# Patient Record
Sex: MALE
[De-identification: names, ages, dates, MRNs, and addresses within clinical notes are randomized per-mention and may not be internally consistent; named-entity substitution may affect disease eponyms.]

---

## 2023-03-31 PROBLEM — Z00.00 ENCOUNTER FOR PREVENTIVE HEALTH EXAMINATION: Status: ACTIVE | Noted: 2023-03-31

## 2023-05-01 ENCOUNTER — APPOINTMENT (OUTPATIENT)
Dept: CARDIOLOGY | Facility: CLINIC | Age: 33
End: 2023-05-01
Payer: SELF-PAY

## 2023-05-01 VITALS
OXYGEN SATURATION: 96 % | DIASTOLIC BLOOD PRESSURE: 82 MMHG | SYSTOLIC BLOOD PRESSURE: 134 MMHG | HEART RATE: 106 BPM | TEMPERATURE: 98.6 F | RESPIRATION RATE: 16 BRPM | HEIGHT: 66 IN | BODY MASS INDEX: 27.48 KG/M2 | WEIGHT: 171 LBS

## 2023-05-01 DIAGNOSIS — Z83.438 FAMILY HISTORY OF OTHER DISORDER OF LIPOPROTEIN METABOLISM AND OTHER LIPIDEMIA: ICD-10-CM

## 2023-05-01 PROCEDURE — 99203 OFFICE O/P NEW LOW 30 MIN: CPT

## 2023-05-01 NOTE — REASON FOR VISIT
[CV Risk Factors and Non-Cardiac Disease] : CV risk factors and non-cardiac disease [Hyperlipidemia] : hyperlipidemia [FreeTextEntry3] : Dr. Hidalgo, Dr. Richardson [FreeTextEntry1] : This is a 32 year old male with past medical history of hypercholesterolemia presenting to the office for a lipid consultation. Patient denies any shortness of breath, palpitations, dizziness or episodes of syncope. \par Family history is significant for hypercholesterolemia. \par Patient denies drinking alcohol but does admit to being a former smoker. Patient quit smoking 1 month ago and smoked 10 -15 cigarettes/day for 20 years. He also drinks 2-3 cups of coffee per day \par Cardiac risk factors include hypercholesterolemia\par Patient had a CT cardiac calcium score of 0 on July 2021. \par \par Labs demonstrated Cholesterol 275 , HDL , Triglyceride 182 ,  , Non-  , Lipoprotein A 186\par

## 2023-05-01 NOTE — DISCUSSION/SUMMARY
[FreeTextEntry1] : This is a 32-year-old male with no significant past medical history who comes in for lipid consultation.\par He denies chest pain, shortness of breath, dizziness or syncope.  He has no history of rheumatic fever.  He does not drink excessive caffeine or alcohol.\par Cardiac risk factors include hypercholesterolemia, history of smoking approximately a pack to a pack and 1/2/week for 20 years (he stopped March 2023).  Both parents have hypercholesterolemia.\par Lipid profile done March 14, 2023 demonstrated elevated lipoprotein a of 186 nmol/L, elevated C-reactive protein of 9.9 mg/dL, cholesterol of 275, HDL of 45, LDL calculated under 94 mg/dL, and non-HDL cholesterol 230 mg/dL.\par The patient had a coronary artery calcium score of 0 in July 2021 (he will get me a copy of that report).\par The patient's lipid profile is consistent with heterozygous familial hypercholesterolemia.  As noted above both parents have hypercholesterolemia.\par The patient's LDL target is less than 100 mg/dL.\par The patient's elevated lipoprotein a is an independent risk marker for early atherosclerosis, in the form of coronary disease, stroke, as well as aortic stenosis.  While there is no current therapy available for isolated elevated lipoprotein a, our target treatment goal remains LDL cholesterol for now.\par He will start Crestor 20 mg daily.  He will follow-up blood work in 6 to 8 weeks.  Further recommendation will made at that time.  If the patient is not at target LDL cholesterol he will require the addition of a second agent to achieve this target goal.\par Lifestyle modification was reinforced.  If his LDL cholesterol does not improve with medical therapy alone, I would recommend he work with our registered dietitian.  I have also informed him to let his children pediatrician know about his hypercholesterolemia as well as any siblings that he may have.\par \par The patient understands that aerobic exercises must be increased to 40 minutes 4 times per week. A detailed discussion of lifestyle modification was done today. The patient has a good understanding of the diagnosis, and treatment plan. Lifestyle modification was also outlined.\par \par Thank you for allowing to participate in care of your patient.  Please do not hesitate to call if you have any questions.\par

## 2023-05-01 NOTE — PHYSICAL EXAM
[Well Developed] : well developed [Well Nourished] : well nourished [No Acute Distress] : no acute distress [Normal Conjunctiva] : normal conjunctiva [Normal Venous Pressure] : normal venous pressure [No Carotid Bruit] : no carotid bruit [Normal S1, S2] : normal S1, S2 [No Rub] : no rub [5th Left ICS - MCL] : palpated at the 5th LICS in the midclavicular line [Normal] : normal [No Precordial Heave] : no precordial heave was noted [Normal Rate] : normal [Rhythm Regular] : regular [Normal S1] : normal S1 [Normal S2] : normal S2 [No Gallop] : no gallop heard [I] : a grade 1 [No Pitting Edema] : no pitting edema present [2+] : left 2+ [No Abnormalities] : the abdominal aorta was not enlarged and no bruit was heard [Clear Lung Fields] : clear lung fields [Good Air Entry] : good air entry [No Respiratory Distress] : no respiratory distress  [Soft] : abdomen soft [Non Tender] : non-tender [No Masses/organomegaly] : no masses/organomegaly [Normal Bowel Sounds] : normal bowel sounds [Normal Gait] : normal gait [No Edema] : no edema [No Cyanosis] : no cyanosis [No Clubbing] : no clubbing [No Varicosities] : no varicosities [No Rash] : no rash [No Skin Lesions] : no skin lesions [No Focal Deficits] : no focal deficits [Moves all extremities] : moves all extremities [Normal Speech] : normal speech [Alert and Oriented] : alert and oriented [Normal memory] : normal memory [S3] : no S3 [S4] : no S4 [Right Carotid Bruit] : no bruit heard over the right carotid [Left Carotid Bruit] : no bruit heard over the left carotid [Right Femoral Bruit] : no bruit heard over the right femoral artery [Left Femoral Bruit] : no bruit heard over the left femoral artery

## 2024-01-18 ENCOUNTER — APPOINTMENT (OUTPATIENT)
Dept: CARDIOLOGY | Facility: CLINIC | Age: 34
End: 2024-01-18

## 2024-02-27 ENCOUNTER — APPOINTMENT (OUTPATIENT)
Dept: CARDIOLOGY | Facility: CLINIC | Age: 34
End: 2024-02-27
Payer: COMMERCIAL

## 2024-02-27 ENCOUNTER — NON-APPOINTMENT (OUTPATIENT)
Age: 34
End: 2024-02-27

## 2024-02-27 VITALS
WEIGHT: 173 LBS | TEMPERATURE: 98 F | BODY MASS INDEX: 27.8 KG/M2 | DIASTOLIC BLOOD PRESSURE: 70 MMHG | RESPIRATION RATE: 16 BRPM | HEART RATE: 94 BPM | OXYGEN SATURATION: 98 % | HEIGHT: 66 IN | SYSTOLIC BLOOD PRESSURE: 127 MMHG

## 2024-02-27 VITALS — HEART RATE: 94 BPM

## 2024-02-27 DIAGNOSIS — E78.41 ELEVATED LIPOPROTEIN(A): ICD-10-CM

## 2024-02-27 DIAGNOSIS — R01.1 CARDIAC MURMUR, UNSPECIFIED: ICD-10-CM

## 2024-02-27 DIAGNOSIS — E78.01 FAMILIAL HYPERCHOLESTEROLEMIA: ICD-10-CM

## 2024-02-27 PROCEDURE — G2211 COMPLEX E/M VISIT ADD ON: CPT

## 2024-02-27 PROCEDURE — 99214 OFFICE O/P EST MOD 30 MIN: CPT

## 2024-02-27 NOTE — ASSESSMENT
[FreeTextEntry1] : This is a 33-year-old male with no significant past medical history who comes in for lipid follow-up evaluation.  He has no history of rheumatic fever.  He does not drink excessive caffeine or alcohol.  Cardiac risk factors include hypercholesterolemia, history of smoking approximately a pack to a pack and 1/2/week for 20 years (he stopped March 2023).  Both parents have hypercholesterolemia.  HPI: He is feeling generally well today and denies chest pain, dizziness, heart palpitations, recent episodes of syncope or falls, SOB, or dyspnea at this time.  Current Medications: Rosuvastatin 20 mg daily   BLOOD PRESSURE: Controlled.   BLOOD WORK:  *LDL target goal < 100* -New blood work given to patient on 02/27/2024 to evaluate lipid profile, CBC, BMP, hepatic function, A1C and TSH before next follow-up appointment.  -Lipid panel done February 2024 demonstrated triglyceride 95, cholesterol 167, HDL 45, , non-, LDL direct 110. -Lipid panel done August 2023 demonstrated triglyceride 136, cholesterol 166, HDL 42, non-, LDL direct 106.  -Lipid profile done March 14, 2023 demonstrated elevated lipoprotein a of 186 nmol/L, elevated C-reactive protein of 9.9 mg/dL, cholesterol of 275, HDL of 45, LDL calculated under 94 mg/dL, and non-HDL cholesterol 230 mg/dL.   TESTING/REPORTS: -EKG done 02/27/2024 demonstrated regular sinus rhythm rate 94 bpm otherwise unremarkable.  -The patient had a coronary artery calcium score of 0 in July 2021 (he will get me a copy of that report).   PLAN: -He will continue his current medications and contact the office if problems arise before next follow-up appointment. -He will repeat his blood work before next follow-up appointment -He will follow-up with his PCP routinely.   I have discussed the plan of care with PARESH MORELANDBUTCH and he will follow up in 4-6 months. They are compliant with all of their medications.     The patient understands that aerobic exercises must be increased to 40 minutes 4 times/week and a detailed discussion of lifestyle modification was done today.   The patient has a good understanding of the diagnosis, treatment plan and lifestyle modification.   They will contact me at the office for any questions with their care or any changes in their health status.   The patient was discussed with supervision physician Dr. Meng Escamilla at the time of the visit and the plan of care will be carried out as noted above.     TARIK Callahan NP

## 2024-02-27 NOTE — DISCUSSION/SUMMARY
[FreeTextEntry1] : Dr. Escamilla-(PRIOR VISIT and PMH WITH Dr. Escamilla): This is a 32-year-old male with no significant past medical history who comes in for lipid consultation. He denies chest pain, shortness of breath, dizziness or syncope.  He has no history of rheumatic fever.  He does not drink excessive caffeine or alcohol. Cardiac risk factors include hypercholesterolemia, history of smoking approximately a pack to a pack and 1/2/week for 20 years (he stopped March 2023).  Both parents have hypercholesterolemia. Lipid profile done March 14, 2023 demonstrated elevated lipoprotein a of 186 nmol/L, elevated C-reactive protein of 9.9 mg/dL, cholesterol of 275, HDL of 45, LDL calculated under 94 mg/dL, and non-HDL cholesterol 230 mg/dL. The patient had a coronary artery calcium score of 0 in July 2021 (he will get me a copy of that report). The patient's lipid profile is consistent with heterozygous familial hypercholesterolemia.  As noted above both parents have hypercholesterolemia. The patient's LDL target is less than 100 mg/dL. The patient's elevated lipoprotein a is an independent risk marker for early atherosclerosis, in the form of coronary disease, stroke, as well as aortic stenosis.  While there is no current therapy available for isolated elevated lipoprotein a, our target treatment goal remains LDL cholesterol for now. He will start Crestor 20 mg daily.  He will follow-up blood work in 6 to 8 weeks.  Further recommendation will made at that time.  If the patient is not at target LDL cholesterol he will require the addition of a second agent to achieve this target goal. Lifestyle modification was reinforced.  If his LDL cholesterol does not improve with medical therapy alone, I would recommend he work with our registered dietitian.  I have also informed him to let his children pediatrician know about his hypercholesterolemia as well as any siblings that he may have.  The patient understands that aerobic exercises must be increased to 40 minutes 4 times per week. A detailed discussion of lifestyle modification was done today. The patient has a good understanding of the diagnosis, and treatment plan. Lifestyle modification was also outlined.  Thank you for allowing to participate in care of your patient.  Please do not hesitate to call if you have any questions.

## 2024-05-31 RX ORDER — ROSUVASTATIN CALCIUM 20 MG/1
20 TABLET, FILM COATED ORAL
Qty: 90 | Refills: 1 | Status: ACTIVE | COMMUNITY
Start: 2023-05-01 | End: 1900-01-01

## 2024-09-10 ENCOUNTER — RX RENEWAL (OUTPATIENT)
Age: 34
End: 2024-09-10

## 2024-11-21 ENCOUNTER — LABORATORY RESULT (OUTPATIENT)
Age: 34
End: 2024-11-21

## 2024-11-21 ENCOUNTER — NON-APPOINTMENT (OUTPATIENT)
Age: 34
End: 2024-11-21

## 2024-11-21 ENCOUNTER — APPOINTMENT (OUTPATIENT)
Dept: CARDIOLOGY | Facility: CLINIC | Age: 34
End: 2024-11-21
Payer: COMMERCIAL

## 2024-11-21 VITALS
BODY MASS INDEX: 27.48 KG/M2 | TEMPERATURE: 98.2 F | RESPIRATION RATE: 16 BRPM | HEART RATE: 84 BPM | OXYGEN SATURATION: 99 % | WEIGHT: 171 LBS | SYSTOLIC BLOOD PRESSURE: 125 MMHG | DIASTOLIC BLOOD PRESSURE: 79 MMHG | HEIGHT: 66 IN

## 2024-11-21 DIAGNOSIS — R01.1 CARDIAC MURMUR, UNSPECIFIED: ICD-10-CM

## 2024-11-21 DIAGNOSIS — E78.41 ELEVATED LIPOPROTEIN(A): ICD-10-CM

## 2024-11-21 DIAGNOSIS — E78.01 FAMILIAL HYPERCHOLESTEROLEMIA: ICD-10-CM

## 2024-11-21 DIAGNOSIS — R00.2 PALPITATIONS: ICD-10-CM

## 2024-11-21 PROCEDURE — 93000 ELECTROCARDIOGRAM COMPLETE: CPT

## 2024-11-21 PROCEDURE — 99214 OFFICE O/P EST MOD 30 MIN: CPT

## 2024-11-21 PROCEDURE — G2211 COMPLEX E/M VISIT ADD ON: CPT | Mod: NC

## 2024-12-17 ENCOUNTER — NON-APPOINTMENT (OUTPATIENT)
Age: 34
End: 2024-12-17

## 2024-12-18 RX ORDER — EZETIMIBE 10 MG/1
10 TABLET ORAL DAILY
Qty: 90 | Refills: 1 | Status: ACTIVE | COMMUNITY
Start: 2024-12-17 | End: 1900-01-01

## 2025-03-12 ENCOUNTER — TRANSCRIPTION ENCOUNTER (OUTPATIENT)
Age: 35
End: 2025-03-12

## 2025-03-19 ENCOUNTER — TRANSCRIPTION ENCOUNTER (OUTPATIENT)
Age: 35
End: 2025-03-19

## 2025-05-27 ENCOUNTER — TRANSCRIPTION ENCOUNTER (OUTPATIENT)
Age: 35
End: 2025-05-27

## 2025-06-10 ENCOUNTER — NON-APPOINTMENT (OUTPATIENT)
Age: 35
End: 2025-06-10

## 2025-06-12 ENCOUNTER — APPOINTMENT (OUTPATIENT)
Dept: CARDIOLOGY | Facility: CLINIC | Age: 35
End: 2025-06-12
Payer: COMMERCIAL

## 2025-06-12 VITALS
OXYGEN SATURATION: 99 % | TEMPERATURE: 98.2 F | DIASTOLIC BLOOD PRESSURE: 72 MMHG | WEIGHT: 168 LBS | HEIGHT: 66 IN | BODY MASS INDEX: 27 KG/M2 | SYSTOLIC BLOOD PRESSURE: 124 MMHG | RESPIRATION RATE: 16 BRPM | HEART RATE: 80 BPM

## 2025-06-12 PROBLEM — E78.01 FAMILIAL HYPERCHOLESTEROLEMIA: Status: ACTIVE | Noted: 2025-06-12

## 2025-06-12 PROCEDURE — 99213 OFFICE O/P EST LOW 20 MIN: CPT

## 2025-06-12 PROCEDURE — G2211 COMPLEX E/M VISIT ADD ON: CPT | Mod: NC

## 2025-06-23 ENCOUNTER — TRANSCRIPTION ENCOUNTER (OUTPATIENT)
Age: 35
End: 2025-06-23

## 2025-09-16 ENCOUNTER — TRANSCRIPTION ENCOUNTER (OUTPATIENT)
Age: 35
End: 2025-09-16